# Patient Record
Sex: MALE | Race: WHITE | NOT HISPANIC OR LATINO | Employment: FULL TIME | URBAN - METROPOLITAN AREA
[De-identification: names, ages, dates, MRNs, and addresses within clinical notes are randomized per-mention and may not be internally consistent; named-entity substitution may affect disease eponyms.]

---

## 2021-11-22 DIAGNOSIS — Z12.11 SCREENING FOR COLON CANCER: Primary | ICD-10-CM

## 2021-11-22 RX ORDER — SODIUM, POTASSIUM,MAG SULFATES 17.5-3.13G
1 SOLUTION, RECONSTITUTED, ORAL ORAL EVERY 12 HOURS
Qty: 354 ML | Refills: 0 | Status: SHIPPED | OUTPATIENT
Start: 2021-11-22 | End: 2022-11-10

## 2021-11-30 ENCOUNTER — OUTSIDE FACILITY SERVICE (OUTPATIENT)
Dept: GASTROENTEROLOGY | Facility: CLINIC | Age: 51
End: 2021-11-30

## 2021-11-30 PROCEDURE — 45378 DIAGNOSTIC COLONOSCOPY: CPT | Performed by: INTERNAL MEDICINE

## 2022-11-10 ENCOUNTER — OFFICE VISIT (OUTPATIENT)
Dept: FAMILY MEDICINE CLINIC | Facility: CLINIC | Age: 52
End: 2022-11-10

## 2022-11-10 VITALS
DIASTOLIC BLOOD PRESSURE: 88 MMHG | TEMPERATURE: 98 F | HEART RATE: 73 BPM | WEIGHT: 208.4 LBS | HEIGHT: 71 IN | OXYGEN SATURATION: 97 % | SYSTOLIC BLOOD PRESSURE: 138 MMHG | RESPIRATION RATE: 15 BRPM | BODY MASS INDEX: 29.18 KG/M2

## 2022-11-10 DIAGNOSIS — Z13.1 SCREENING FOR DIABETES MELLITUS: ICD-10-CM

## 2022-11-10 DIAGNOSIS — R73.03 PREDIABETES: ICD-10-CM

## 2022-11-10 DIAGNOSIS — Z00.00 GENERAL MEDICAL EXAM: Primary | ICD-10-CM

## 2022-11-10 DIAGNOSIS — Z11.59 NEED FOR HEPATITIS C SCREENING TEST: ICD-10-CM

## 2022-11-10 DIAGNOSIS — Z13.220 SCREENING FOR HYPERLIPIDEMIA: ICD-10-CM

## 2022-11-10 DIAGNOSIS — Z12.5 SCREENING FOR PROSTATE CANCER: ICD-10-CM

## 2022-11-10 DIAGNOSIS — Z01.89 ENCOUNTER FOR TOBACCO USE SCREENING: ICD-10-CM

## 2022-11-10 PROBLEM — E78.2 MIXED HYPERLIPIDEMIA: Status: ACTIVE | Noted: 2022-11-10

## 2022-11-10 PROCEDURE — 36415 COLL VENOUS BLD VENIPUNCTURE: CPT | Performed by: PHYSICIAN ASSISTANT

## 2022-11-10 PROCEDURE — 99396 PREV VISIT EST AGE 40-64: CPT | Performed by: PHYSICIAN ASSISTANT

## 2022-11-10 NOTE — PROGRESS NOTES
Annual Physical-Preventive Visit     Patient Name: Alex Reese  : 1970   MRN: [id number]     Chief Complaint:    Chief Complaint   Patient presents with   • Annual Exam     Needs biometric screening form for his health insurance discount.       History of Present Illness: Alex Reese is a 52 y.o. male who is here today for their annual health maintenance and physical.  He has no additional complaints today.    Subjective      Review of Systems:   Review of Systems   Constitutional: Negative for fatigue and fever.   HENT: Negative for hearing loss.    Eyes: Negative for visual disturbance.   Respiratory: Negative for shortness of breath.    Cardiovascular: Negative for chest pain, palpitations and leg swelling.   Gastrointestinal: Negative for abdominal pain, blood in stool, constipation and diarrhea.   Genitourinary: Negative for difficulty urinating.   Musculoskeletal: Negative for arthralgias and myalgias.   Skin: Negative for rash.   Allergic/Immunologic: Negative for immunocompromised state.   Psychiatric/Behavioral: Negative for dysphoric mood. The patient is not nervous/anxious.         Past History:  Medical History: has a past medical history of COVID-19, Mixed hyperlipidemia, and Prediabetes.   Surgical History: has no past surgical history on file.   Family History: family history includes Colon cancer in his father.   Social History: reports that he quit smoking about 21 years ago. His smoking use included cigarettes. He started smoking about 31 years ago. He has a 10.00 pack-year smoking history. He has never used smokeless tobacco. He reports current alcohol use. He reports that he does not use drugs.    Health Maintenance   Topic Date Due   • ANNUAL PHYSICAL  Never done   • HEPATITIS C SCREENING  Never done   • ZOSTER VACCINE (1 of 2) 11/10/2022 (Originally 2020)   • COVID-19 Vaccine (1) 2022 (Originally 1970)   • INFLUENZA VACCINE  2023 (Originally  "8/1/2022)   • LIPID PANEL  11/11/2022   • TDAP/TD VACCINES (2 - Td or Tdap) 12/13/2023   • COLORECTAL CANCER SCREENING  11/30/2026   • Pneumococcal Vaccine 0-64  Aged Out        Immunization History   Administered Date(s) Administered   • Tdap 12/13/2013       Medications:   No current outpatient medications on file.    Allergies:   Allergies   Allergen Reactions   • Penicillins Unknown - High Severity     Pt has reaction when he was a child        Depression: PHQ-2 Depression Screening  Little interest or pleasure in doing things?     Feeling down, depressed, or hopeless?     PHQ-2 Total Score        Predictive Model Details   No score data available for Risk of Fall         Objective     Physical Exam:  Vital Signs:   Vitals:    11/10/22 0839   BP: 138/88   BP Location: Right arm   Patient Position: Sitting   Cuff Size: Large Adult   Pulse: 73   Resp: 15   Temp: 98 °F (36.7 °C)   TempSrc: Temporal   SpO2: 97%   Weight: 94.5 kg (208 lb 6.4 oz)   Height: 180.3 cm (71\")     Body mass index is 29.07 kg/m².   BMI is >= 25 and <30. (Overweight) The following options were offered after discussion;: weight loss educational material (shared in after visit summary)       Physical Exam  Constitutional:       Appearance: He is normal weight.   Cardiovascular:      Rate and Rhythm: Normal rate and regular rhythm.   Pulmonary:      Effort: Pulmonary effort is normal.      Breath sounds: Normal breath sounds.   Neurological:      General: No focal deficit present.   Psychiatric:         Thought Content: Thought content normal.         Judgment: Judgment normal.         Procedures    Assessment / Plan      Assessment/Plan:   Diagnoses and all orders for this visit:    1. General medical exam (Primary)  -     Comprehensive Metabolic Panel  -     CBC & Differential  -     Vitamin B12  -     Folate  -     Lipid Panel  -     Hemoglobin A1c  -     TSH  -     T4, Free  -     Hepatitis C Antibody  -     PSA Screen  -     CK  -     " Nicotine & Metabolite, Quant    2. Screening for hyperlipidemia  -     Lipid Panel    3. Screening for diabetes mellitus  -     Hemoglobin A1c    4. Screening for prostate cancer  -     PSA Screen    5. Encounter for tobacco use screening  -     Nicotine & Metabolite, Quant    6. Need for hepatitis C screening test  -     Hepatitis C Antibody    7. Prediabetes  Assessment & Plan:  Counseled on diet and exercise, will continue to monitor.           No current outpatient medications on file.    Follow Up:   Return in about 1 year (around 11/10/2023) for Annual physical.    Healthcare Maintenance:   Counseling provided on healthy diet and exercise.  His colonoscopy is up-to-date.  He declines vaccinations.    Alex Hugh voices understanding and acceptance of this advice.  AVS with preventive healthcare tips printed for patient.     Karin Mccartney PA-C  [facility]

## 2022-11-10 NOTE — ASSESSMENT & PLAN NOTE
Counseled on healthy diet and exercise.  Discussed metabolic syndrome.  Discussed working on diet and exercise and will continue to monitor.

## 2022-11-10 NOTE — PATIENT INSTRUCTIONS
"Healthy Eating  Following a healthy eating pattern may help you to achieve and maintain a healthy body weight, reduce the risk of chronic disease, and live a long and productive life. It is important to follow a healthy eating pattern at an appropriate calorie level for your body. Your nutritional needs should be met primarily through food by choosing a variety of nutrient-rich foods.  What are tips for following this plan?  Reading food labels  Read labels and choose the following:  Reduced or low sodium.  Juices with 100% fruit juice.  Foods with low saturated fats and high polyunsaturated and monounsaturated fats.  Foods with whole grains, such as whole wheat, cracked wheat, brown rice, and wild rice.  Whole grains that are fortified with folic acid. This is recommended for women who are pregnant or who want to become pregnant.  Read labels and avoid the following:  Foods with a lot of added sugars. These include foods that contain brown sugar, corn sweetener, corn syrup, dextrose, fructose, glucose, high-fructose corn syrup, honey, invert sugar, lactose, malt syrup, maltose, molasses, raw sugar, sucrose, trehalose, or turbinado sugar.  Do not eat more than the following amounts of added sugar per day:  6 teaspoons (25 g) for women.  9 teaspoons (38 g) for men.  Foods that contain processed or refined starches and grains.  Refined grain products, such as white flour, degermed cornmeal, white bread, and white rice.  Shopping  Choose nutrient-rich snacks, such as vegetables, whole fruits, and nuts. Avoid high-calorie and high-sugar snacks, such as potato chips, fruit snacks, and candy.  Use oil-based dressings and spreads on foods instead of solid fats such as butter, stick margarine, or cream cheese.  Limit pre-made sauces, mixes, and \"instant\" products such as flavored rice, instant noodles, and ready-made pasta.  Try more plant-protein sources, such as tofu, tempeh, black beans, edamame, lentils, nuts, and " seeds.  Explore eating plans such as the Mediterranean diet or vegetarian diet.  Cooking  Use oil to sauté or stir-contreras foods instead of solid fats such as butter, stick margarine, or lard.  Try baking, boiling, grilling, or broiling instead of frying.  Remove the fatty part of meats before cooking.  Steam vegetables in water or broth.  Meal planning    At meals, imagine dividing your plate into fourths:  One-half of your plate is fruits and vegetables.  One-fourth of your plate is whole grains.  One-fourth of your plate is protein, especially lean meats, poultry, eggs, tofu, beans, or nuts.  Include low-fat dairy as part of your daily diet.     Lifestyle  Choose healthy options in all settings, including home, work, school, restaurants, or stores.  Prepare your food safely:  Wash your hands after handling raw meats.  Keep food preparation surfaces clean by regularly washing with hot, soapy water.  Keep raw meats separate from ready-to-eat foods, such as fruits and vegetables.  , meat, poultry, and eggs to the recommended internal temperature.  Store foods at safe temperatures. In general:  Keep cold foods at 40°F (4.4°C) or below.  Keep hot foods at 140°F (60°C) or above.  Keep your freezer at 0°F (-17.8°C) or below.  Foods are no longer safe to eat when they have been between the temperatures of 40°-140°F (4.4-60°C) for more than 2 hours.  What foods should I eat?  Fruits  Aim to eat 2 cup-equivalents of fresh, canned (in natural juice), or frozen fruits each day. Examples of 1 cup-equivalent of fruit include 1 small apple, 8 large strawberries, 1 cup canned fruit, ½ cup dried fruit, or 1 cup 100% juice.  Vegetables  Aim to eat 2½-3 cup-equivalents of fresh and frozen vegetables each day, including different varieties and colors. Examples of 1 cup-equivalent of vegetables include 2 medium carrots, 2 cups raw, leafy greens, 1 cup chopped vegetable (raw or cooked), or 1 medium baked potato.  Grains  Aim  to eat 6 ounce-equivalents of whole grains each day. Examples of 1 ounce-equivalent of grains include 1 slice of bread, 1 cup ready-to-eat cereal, 3 cups popcorn, or ½ cup cooked rice, pasta, or cereal.  Meats and other proteins  Aim to eat 5-6 ounce-equivalents of protein each day. Examples of 1 ounce-equivalent of protein include 1 egg, 1/2 cup nuts or seeds, or 1 tablespoon (16 g) peanut butter. A cut of meat or fish that is the size of a deck of cards is about 3-4 ounce-equivalents.  Of the protein you eat each week, try to have at least 8 ounces come from seafood. This includes salmon, trout, herring, and anchovies.  Dairy  Aim to eat 3 cup-equivalents of fat-free or low-fat dairy each day. Examples of 1 cup-equivalent of dairy include 1 cup (240 mL) milk, 8 ounces (250 g) yogurt, 1½ ounces (44 g) natural cheese, or 1 cup (240 mL) fortified soy milk.  Fats and oils  Aim for about 5 teaspoons (21 g) per day. Choose monounsaturated fats, such as canola and olive oils, avocados, peanut butter, and most nuts, or polyunsaturated fats, such as sunflower, corn, and soybean oils, walnuts, pine nuts, sesame seeds, sunflower seeds, and flaxseed.  Beverages  Aim for six 8-oz glasses of water per day. Limit coffee to three to five 8-oz cups per day.  Limit caffeinated beverages that have added calories, such as soda and energy drinks.  Limit alcohol intake to no more than 1 drink a day for nonpregnant women and 2 drinks a day for men. One drink equals 12 oz of beer (355 mL), 5 oz of wine (148 mL), or 1½ oz of hard liquor (44 mL).  Seasoning and other foods  Avoid adding excess amounts of salt to your foods. Try flavoring foods with herbs and spices instead of salt.  Avoid adding sugar to foods.  Try using oil-based dressings, sauces, and spreads instead of solid fats.  This information is based on general [place] nutrition guidelines. For more information, visit choosemyplate.gov. Exact amounts may vary based on your  nutrition needs.  Summary  A healthy eating plan may help you to maintain a healthy weight, reduce the risk of chronic diseases, and stay active throughout your life.  Plan your meals. Make sure you eat the right portions of a variety of nutrient-rich foods.  Try baking, boiling, grilling, or broiling instead of frying.  Choose healthy options in all settings, including home, work, school, restaurants, or stores.  This information is not intended to replace advice given to you by your health care provider. Make sure you discuss any questions you have with your health care provider.  Document Revised: 04/01/2019 Document Reviewed: 04/01/2019  Elsevier Patient Education © 2021 [employer] Inc.

## 2022-11-11 LAB
ALBUMIN SERPL-MCNC: NORMAL G/DL
ALP SERPL-CCNC: NORMAL U/L
ALT SERPL-CCNC: NORMAL U/L
AST SERPL-CCNC: NORMAL U/L
BASOPHILS # BLD AUTO: 0 X10E3/UL (ref 0–0.2)
BASOPHILS NFR BLD AUTO: 1 %
BILIRUB SERPL-MCNC: NORMAL MG/DL
BUN SERPL-MCNC: NORMAL MG/DL
CALCIUM SERPL-MCNC: NORMAL MG/DL
CHLORIDE SERPL-SCNC: NORMAL MMOL/L
CHOLEST SERPL-MCNC: 321 MG/DL (ref 100–199)
CK SERPL-CCNC: 117 U/L (ref 41–331)
CO2 SERPL-SCNC: NORMAL MMOL/L
CREAT SERPL-MCNC: NORMAL MG/DL
EOSINOPHIL # BLD AUTO: 0.1 X10E3/UL (ref 0–0.4)
EOSINOPHIL NFR BLD AUTO: 2 %
ERYTHROCYTE [DISTWIDTH] IN BLOOD BY AUTOMATED COUNT: 12.2 % (ref 11.6–15.4)
FOLATE SERPL-MCNC: 17.2 NG/ML
GLUCOSE SERPL-MCNC: NORMAL MG/DL
HBA1C MFR BLD: 6 % (ref 4.8–5.6)
HCT VFR BLD AUTO: 46.2 % (ref 37.5–51)
HCV AB S/CO SERPL IA: <0.1 S/CO RATIO (ref 0–0.9)
HDLC SERPL-MCNC: 63 MG/DL
HGB BLD-MCNC: 15.6 G/DL (ref 13–17.7)
IMM GRANULOCYTES # BLD AUTO: 0 X10E3/UL (ref 0–0.1)
IMM GRANULOCYTES NFR BLD AUTO: 1 %
LABORATORY COMMENT REPORT: ABNORMAL
LDLC SERPL CALC-MCNC: 236 MG/DL (ref 0–99)
LYMPHOCYTES # BLD AUTO: 1.9 X10E3/UL (ref 0.7–3.1)
LYMPHOCYTES NFR BLD AUTO: 32 %
MCH RBC QN AUTO: 31 PG (ref 26.6–33)
MCHC RBC AUTO-ENTMCNC: 33.8 G/DL (ref 31.5–35.7)
MCV RBC AUTO: 92 FL (ref 79–97)
MONOCYTES # BLD AUTO: 0.5 X10E3/UL (ref 0.1–0.9)
MONOCYTES NFR BLD AUTO: 8 %
NEUTROPHILS # BLD AUTO: 3.4 X10E3/UL (ref 1.4–7)
NEUTROPHILS NFR BLD AUTO: 56 %
PLATELET # BLD AUTO: 322 X10E3/UL (ref 150–450)
POTASSIUM SERPL-SCNC: NORMAL MMOL/L
PROT SERPL-MCNC: NORMAL G/DL
PSA SERPL-MCNC: 1.4 NG/ML (ref 0–4)
RBC # BLD AUTO: 5.04 X10E6/UL (ref 4.14–5.8)
SODIUM SERPL-SCNC: NORMAL MMOL/L
SPECIMEN STATUS: NORMAL
T4 FREE SERPL-MCNC: 1.12 NG/DL (ref 0.82–1.77)
TRIGL SERPL-MCNC: 124 MG/DL (ref 0–149)
TSH SERPL DL<=0.005 MIU/L-ACNC: 1.07 UIU/ML (ref 0.45–4.5)
VIT B12 SERPL-MCNC: 369 PG/ML (ref 232–1245)
VLDLC SERPL CALC-MCNC: 22 MG/DL (ref 5–40)
WBC # BLD AUTO: 6 X10E3/UL (ref 3.4–10.8)

## 2022-11-12 LAB
ALBUMIN SERPL-MCNC: 5 G/DL (ref 3.8–4.9)
ALBUMIN/GLOB SERPL: 1.9 {RATIO} (ref 1.2–2.2)
ALP SERPL-CCNC: 80 IU/L (ref 44–121)
ALT SERPL-CCNC: 66 IU/L (ref 0–44)
AST SERPL-CCNC: 44 IU/L (ref 0–40)
BILIRUB SERPL-MCNC: 0.4 MG/DL (ref 0–1.2)
BUN SERPL-MCNC: 17 MG/DL (ref 6–24)
BUN/CREAT SERPL: 16 (ref 9–20)
CALCIUM SERPL-MCNC: 9.9 MG/DL (ref 8.7–10.2)
CHLORIDE SERPL-SCNC: 101 MMOL/L (ref 96–106)
CO2 SERPL-SCNC: 18 MMOL/L (ref 20–29)
CREAT SERPL-MCNC: 1.04 MG/DL (ref 0.76–1.27)
EGFRCR SERPLBLD CKD-EPI 2021: 86 ML/MIN/1.73
GLOBULIN SER CALC-MCNC: 2.7 G/DL (ref 1.5–4.5)
GLUCOSE SERPL-MCNC: 100 MG/DL (ref 70–99)
POTASSIUM SERPL-SCNC: 4.8 MMOL/L (ref 3.5–5.2)
PROT SERPL-MCNC: 7.7 G/DL (ref 6–8.5)
SODIUM SERPL-SCNC: 141 MMOL/L (ref 134–144)

## 2022-11-16 LAB
COTININE SERPL-MCNC: <1 NG/ML
NICOTINE SERPL-MCNC: <1 NG/ML

## 2022-11-17 ENCOUNTER — TELEPHONE (OUTPATIENT)
Dept: FAMILY MEDICINE CLINIC | Facility: CLINIC | Age: 52
End: 2022-11-17

## 2022-11-17 NOTE — TELEPHONE ENCOUNTER
----- Message from GAGE Diez sent at 11/16/2022  5:02 PM EST -----  Patient's LDL which is his bad cholesterol is very elevated at 236.  He is got some liver enzymes that are a little elevated.  His nicotine screening was negative.  His hemoglobin A1c is in the prediabetes range.  Please have patient schedule appointment with me to discuss his labs and his treatment options.

## 2022-11-17 NOTE — TELEPHONE ENCOUNTER
Caller: Eric Sin    Relationship: Self    Best call back number: [phone]    What is the best time to reach you: ANYTIME    Who are you requesting to speak with (clinical staff, provider,  specific staff member): CLINICAL STAFF    Do you know the name of the person who called: ERIC    What was the call regarding: VITALITY CHECK PAPERWORK. PATIENT WOULD LIKE TO KNOW IF THIS HAS BEEN FILLED OUT. SHE WOULD LIKE TO PICK IT UP TODAY IF POSSIBLE.    Do you require a callback: YES.

## 2022-11-21 ENCOUNTER — OFFICE VISIT (OUTPATIENT)
Dept: FAMILY MEDICINE CLINIC | Facility: CLINIC | Age: 52
End: 2022-11-21

## 2022-11-21 VITALS
HEART RATE: 86 BPM | RESPIRATION RATE: 15 BRPM | WEIGHT: 208.2 LBS | HEIGHT: 71 IN | OXYGEN SATURATION: 97 % | DIASTOLIC BLOOD PRESSURE: 84 MMHG | BODY MASS INDEX: 29.15 KG/M2 | TEMPERATURE: 98 F | SYSTOLIC BLOOD PRESSURE: 138 MMHG

## 2022-11-21 DIAGNOSIS — R74.8 ELEVATED LIVER ENZYMES: ICD-10-CM

## 2022-11-21 DIAGNOSIS — R73.03 PREDIABETES: ICD-10-CM

## 2022-11-21 DIAGNOSIS — E78.00 HYPERCHOLESTEROLEMIA: Primary | ICD-10-CM

## 2022-11-21 DIAGNOSIS — Z79.899 HIGH RISK MEDICATION USE: ICD-10-CM

## 2022-11-21 PROBLEM — E78.2 MIXED HYPERLIPIDEMIA: Status: RESOLVED | Noted: 2022-11-10 | Resolved: 2022-11-21

## 2022-11-21 PROCEDURE — 99214 OFFICE O/P EST MOD 30 MIN: CPT | Performed by: PHYSICIAN ASSISTANT

## 2022-11-21 RX ORDER — ATORVASTATIN CALCIUM 20 MG/1
20 TABLET, FILM COATED ORAL DAILY
Qty: 90 TABLET | Refills: 0 | Status: SHIPPED | OUTPATIENT
Start: 2022-11-21 | End: 2023-03-06

## 2022-11-21 NOTE — PROGRESS NOTES
Patient Office Visit      Patient Name: Alex Reese  : 1970   MRN: [id number]     Chief Complaint:    Chief Complaint   Patient presents with   • Hyperlipidemia   • Elevated Hepatic Enzymes   • Prediabetes       History of Present Illness: Alex Reese is a 52 y.o. male who is here today to discuss labs done on November 10.  His ALT was increased at 66 and his AST was slightly increased at 44.  His A1c is still in the prediabetes range at 6.0.  This was 6.1 in 2021.  His total cholesterol was elevated at 321, HDL 63,  and triglycerides 124.  His previous LDL was 177 and previous total cholesterol was 278.  There is a strong family history of premature coronary artery disease.  His father  of a massive heart attack at age 53.  His father was a smoker and Alex is not a smoker.  Alex does admit to some daily alcohol use and eating poorly.  He said since he saw his results he has cut back on alcohol and started eating better.      Subjective      Review of Systems:   Review of Systems   Constitutional: Negative for fatigue.   Respiratory: Negative for shortness of breath.    Cardiovascular: Negative for chest pain, palpitations and leg swelling.        Past Medical History:   Past Medical History:   Diagnosis Date   • COVID-19    • Mixed hyperlipidemia    • Prediabetes        Past Surgical History: History reviewed. No pertinent surgical history.    Family History:   Family History   Problem Relation Age of Onset   • Colon cancer Father        Social History:   Social History     Socioeconomic History   • Marital status:    Tobacco Use   • Smoking status: Former     Packs/day: 1.00     Years: 10.00     Pack years: 10.00     Types: Cigarettes     Start date:      Quit date: 2001     Years since quittin.9   • Smokeless tobacco: Never   Vaping Use   • Vaping Use: Never used   Substance and Sexual Activity   • Alcohol use: Yes     Comment: soically   •  "Drug use: Never   • Sexual activity: Yes     Partners: Female       Allergies:   Allergies   Allergen Reactions   • Penicillins Unknown - High Severity     Pt has reaction when he was a child        Objective     Physical Exam:  Vital Signs:   Vitals:    11/21/22 1023   BP: 138/84   BP Location: Right arm   Patient Position: Sitting   Cuff Size: Large Adult   Pulse: 86   Resp: 15   Temp: 98 °F (36.7 °C)   SpO2: 97%   Weight: 94.4 kg (208 lb 3.2 oz)   Height: 180.3 cm (71\")     Body mass index is 29.04 kg/m².   BMI is >= 25 and <30. (Overweight) The following options were offered after discussion;: weight loss educational material (shared in after visit summary)       Physical Exam  Constitutional:       Appearance: He is normal weight.   Cardiovascular:      Rate and Rhythm: Normal rate and regular rhythm.   Pulmonary:      Effort: Pulmonary effort is normal.      Breath sounds: Normal breath sounds.   Neurological:      General: No focal deficit present.   Psychiatric:         Thought Content: Thought content normal.         Judgment: Judgment normal.         Procedures    Assessment / Plan      Assessment/Plan:   Diagnoses and all orders for this visit:    1. Hypercholesterolemia (Primary)  -     atorvastatin (LIPITOR) 20 MG tablet; Take 1 tablet by mouth Daily.  Dispense: 90 tablet; Refill: 0  -     Lipid Panel; Future    2. High risk medication use  -     Comprehensive Metabolic Panel; Future  -     CBC & Differential; Future  -     CK; Future    3. Prediabetes  -     Hemoglobin A1c; Future    4. Elevated liver enzymes  -     Comprehensive Metabolic Panel; Future       Continue work on healthy diet and exercise.  He most likely has a hereditary hyperlipidemia.  His LDL goal is less than 70.  We will start atorvastatin 20 mg daily and check lipids again in about 3 months.  More than likely were going to need atorvastatin 40 or possibly even 80 mg daily.  Continue to cut back on the alcohol use as this should help " his blood sugar and his liver enzymes.    Medications:     Current Outpatient Medications:   •  atorvastatin (LIPITOR) 20 MG tablet, Take 1 tablet by mouth Daily., Disp: 90 tablet, Rfl: 0        Follow Up:   Return in about 3 months (around 2/21/2023) for 30 minute med recheck with labs one week prior.    Karin Mccartney PA-C   [facility]

## 2023-03-06 DIAGNOSIS — E78.00 HYPERCHOLESTEROLEMIA: ICD-10-CM

## 2023-03-06 RX ORDER — ATORVASTATIN CALCIUM 20 MG/1
TABLET, FILM COATED ORAL
Qty: 90 TABLET | Refills: 0 | Status: SHIPPED | OUTPATIENT
Start: 2023-03-06

## 2023-03-13 DIAGNOSIS — E78.00 HYPERCHOLESTEROLEMIA: ICD-10-CM

## 2023-03-13 RX ORDER — ATORVASTATIN CALCIUM 20 MG/1
20 TABLET, FILM COATED ORAL DAILY
Qty: 90 TABLET | Refills: 0 | OUTPATIENT
Start: 2023-03-13

## 2023-03-13 NOTE — TELEPHONE ENCOUNTER
Caller: Alex Reese    Relationship: Self    Best call back number: [phone]    Requested Prescriptions:   Requested Prescriptions     Pending Prescriptions Disp Refills   • atorvastatin (LIPITOR) 20 MG tablet 90 tablet 0     Sig: Take 1 tablet by mouth Daily.        Pharmacy where request should be sent: [facility]/PHARMACY #[id number] - [facility] [address] [facility] - [phone] [facility] [phone]      Additional details provided by patient:  PATIENT OUT OF MEDICATION.      Does the patient have less than a 3 day supply:  [x] Yes  [] No    Would you like a call back once the refill request has been completed: [x] Yes [] No    If the office needs to give you a call back, can they leave a voicemail: [x] Yes [] No    Ena Lopez   03/13/23 08:28 EDT

## 2023-06-20 ENCOUNTER — TELEPHONE (OUTPATIENT)
Dept: FAMILY MEDICINE CLINIC | Facility: CLINIC | Age: 53
End: 2023-06-20

## 2023-06-20 NOTE — TELEPHONE ENCOUNTER
Incoming Refill Request      Medication requested (name and dose):   LIPITOR 20 mg     Pharmacy where request should be sent:   [facility]     Additional details provided by patient: Patient was put on med back in March, just ran out yesterday. He did sched a lab appt to check his levels this Thursday but I couldn't get him sched for an OV til July 20 w/ Karin. Can she send in enough to get him through til appt.     Best call back number: [phone]    Does the patient have less than a 3 day supply:  [x] Yes  [] No    Manjula Hein Rep  06/20/23, 09:03 EDT

## 2023-07-07 ENCOUNTER — TELEPHONE (OUTPATIENT)
Dept: FAMILY MEDICINE CLINIC | Facility: CLINIC | Age: 53
End: 2023-07-07

## 2023-07-07 DIAGNOSIS — E78.00 HYPERCHOLESTEROLEMIA: ICD-10-CM

## 2023-07-07 RX ORDER — ATORVASTATIN CALCIUM 20 MG/1
20 TABLET, FILM COATED ORAL DAILY
Qty: 90 TABLET | Refills: 0 | Status: CANCELLED | OUTPATIENT
Start: 2023-07-07

## 2023-07-07 NOTE — TELEPHONE ENCOUNTER
Rx Refill Note    Requested Prescriptions     Pending Prescriptions Disp Refills    atorvastatin (LIPITOR) 20 MG tablet 90 tablet 0     Sig: Take 1 tablet by mouth Daily.        Last office visit with prescribing clinician: 11/21/2022      Next office visit with prescribing clinician: 7/20/2023   Last labs:   Last refill: can you refill a 90 day for patient?  Insurance will not cover unless it is a 90 day.  Patient has not been seen since 11/21/2022   Pharmacy (be sure to add in Epic). correct

## 2023-07-07 NOTE — TELEPHONE ENCOUNTER
Caller: CHUCK STANTON    Relationship: Emergency Contact    Best call back number: [phone]    What medications are you currently taking:   Current Outpatient Medications on File Prior to Visit   Medication Sig Dispense Refill    atorvastatin (LIPITOR) 20 MG tablet Take 1 tablet by mouth Daily. 30 tablet 0     No current facility-administered medications on file prior to visit.      Which medication are you concerned about: atorvastatin (LIPITOR) 20 MG tablet     Who prescribed you this medication: SERGIO     What are your concerns: PATIENT'S PHARMACY TOLD THE PATIENT THAT THEY HAVE NOT GOTTEN A RESPONSE FROM THE DOCTOR AND TOLD THEM TO CALL.  INSURANCE WILL NOT FILL PRESCRIPTION IF IT IS NOT 90 DAYS.

## 2023-08-04 DIAGNOSIS — E78.00 HYPERCHOLESTEROLEMIA: ICD-10-CM

## 2023-08-04 RX ORDER — ATORVASTATIN CALCIUM 20 MG/1
TABLET, FILM COATED ORAL
Qty: 30 TABLET | Refills: 1 | OUTPATIENT
Start: 2023-08-04

## 2023-09-12 DIAGNOSIS — E78.00 HYPERCHOLESTEROLEMIA: ICD-10-CM

## 2023-09-12 NOTE — TELEPHONE ENCOUNTER
Caller: CHUCK STANTON    Relationship: Emergency Contact    Best call back number: [phone]     Requested Prescriptions:   Requested Prescriptions     Pending Prescriptions Disp Refills    atorvastatin (LIPITOR) 20 MG tablet 30 tablet 1     Sig: Take 1 tablet by mouth Daily.        Pharmacy where request should be sent: [facility]/PHARMACY #[id number] - [place], [place] - [address] - [phone]  - [phone]     Last office visit with prescribing clinician: 11/21/2022   Last telemedicine visit with prescribing clinician: Visit date not found   Next office visit with prescribing clinician: 10/4/2023     Additional details provided by patient:   PATIENT'S (WIFE) CHUCK STATED THAT THIS MEDICATION IS CHEAPER WITH INSURANCE IF CALLED IN FOR A 90 DAY SUPPLIES INSTEAD OF A 30 DAY SUPPLIES SINCE WITH THE 30 DAY SUPPLIES PATIENT HAS TO PAY A INCREASED COST OUT OF POCKET     Does the patient have less than a 3 day supply:  [x] Yes  [] No    Would you like a call back once the refill request has been completed: [] Yes [x] No    If the office needs to give you a call back, can they leave a voicemail: [] Yes [x] No    Manjula Denson Rep   09/12/23 15:04 EDT

## 2023-09-13 RX ORDER — ATORVASTATIN CALCIUM 20 MG/1
20 TABLET, FILM COATED ORAL DAILY
Qty: 90 TABLET | Refills: 0 | Status: SHIPPED | OUTPATIENT
Start: 2023-09-13

## 2023-09-13 NOTE — TELEPHONE ENCOUNTER
Rx Refill Note    Requested Prescriptions     Pending Prescriptions Disp Refills    atorvastatin (LIPITOR) 20 MG tablet 90 tablet 0     Sig: Take 1 tablet by mouth Daily.        Last office visit with prescribing clinician: 11/21/2022      Next office visit with prescribing clinician: 10/4/2023   Last labs:   Last refill: needs 90 day due to it being cheaper through insurance    Pharmacy (be sure to add in Epic). correct

## 2023-09-13 NOTE — TELEPHONE ENCOUNTER
Patient's wife called stating 90 days is less expensive.  I tried to call both patient and his wife.  [place] the [place].  I had a discussion with her that the reason for her only 30-day prescriptions is because of noncompliance.  His cholesterol was very elevated and he was last seen in November and was supposed to come back in February and he has canceled numerous appointments.  I told her I would send in this 90 days but he has to keep his appointment in October and if he does not I will no longer continue to see him due to noncompliance.

## 2023-10-04 ENCOUNTER — OFFICE VISIT (OUTPATIENT)
Dept: FAMILY MEDICINE CLINIC | Facility: CLINIC | Age: 53
End: 2023-10-04
Payer: COMMERCIAL

## 2023-10-04 VITALS
WEIGHT: 209.2 LBS | SYSTOLIC BLOOD PRESSURE: 132 MMHG | OXYGEN SATURATION: 100 % | HEART RATE: 70 BPM | RESPIRATION RATE: 15 BRPM | DIASTOLIC BLOOD PRESSURE: 84 MMHG | TEMPERATURE: 98 F | HEIGHT: 71 IN | BODY MASS INDEX: 29.29 KG/M2

## 2023-10-04 DIAGNOSIS — E78.00 HYPERCHOLESTEROLEMIA: ICD-10-CM

## 2023-10-04 DIAGNOSIS — Z00.00 GENERAL MEDICAL EXAM: Primary | ICD-10-CM

## 2023-10-04 DIAGNOSIS — Z01.89 ENCOUNTER FOR SCREENING FOR TOBACCO USE: ICD-10-CM

## 2023-10-04 DIAGNOSIS — R73.03 PREDIABETES: ICD-10-CM

## 2023-10-04 DIAGNOSIS — R74.8 ELEVATED LIVER ENZYMES: ICD-10-CM

## 2023-10-04 DIAGNOSIS — R05.3 CHRONIC COUGH: ICD-10-CM

## 2023-10-04 RX ORDER — LEVOCETIRIZINE DIHYDROCHLORIDE 5 MG/1
5 TABLET, FILM COATED ORAL EVERY EVENING
Qty: 90 TABLET | Refills: 3 | Status: SHIPPED | OUTPATIENT
Start: 2023-10-04

## 2023-10-04 RX ORDER — ATORVASTATIN CALCIUM 20 MG/1
20 TABLET, FILM COATED ORAL DAILY
Qty: 90 TABLET | Refills: 3 | Status: SHIPPED | OUTPATIENT
Start: 2023-10-04

## 2023-10-04 NOTE — ASSESSMENT & PLAN NOTE
Examinations offered and declined.  Counseled patient on healthy diet and exercise.  His last PSA was checked less than a year ago so we will have to check his PSA level next visit.

## 2023-10-04 NOTE — PROGRESS NOTES
Annual Physical-Preventive Visit     Patient Name: Alex Reese  : 1970   MRN: [id number]     Chief Complaint:    Chief Complaint   Patient presents with    Annual Exam    Cough    Hyperlipidemia       History of Present Illness: Alex Reese is a 53 y.o. male who is here today for their annual health maintenance and physical.  He needs his biometric screening paperwork completed.  He needs his cholesterol medication refilled.  He also complains of a chronic cough for as long as he can remember.  The cough will be productive of clear to white mucus.  He is unsure if the mucus is coming from the back of his throat or from his lungs.  He denies a history of asthma or allergies.  He denies feeling bad denies any wheezing.  His wife wanted him to have the cough evaluated.    Subjective      Review of Systems:   Review of Systems   Constitutional:  Negative for fatigue and fever.   HENT:  Negative for hearing loss.    Eyes:  Negative for visual disturbance.   Respiratory:  Positive for cough. Negative for shortness of breath.    Cardiovascular:  Negative for chest pain, palpitations and leg swelling.   Gastrointestinal:  Negative for abdominal pain, blood in stool, constipation and diarrhea.   Genitourinary:  Negative for difficulty urinating.   Musculoskeletal:  Negative for arthralgias and myalgias.   Skin:  Negative for rash.   Allergic/Immunologic: Negative for immunocompromised state.   Psychiatric/Behavioral:  Negative for dysphoric mood. The patient is not nervous/anxious.       Past History:  Medical History: has a past medical history of COVID-19, Mixed hyperlipidemia, and Prediabetes.   Surgical History: has no past surgical history on file.   Family History: family history includes Colon cancer in his father.   Social History: reports that he quit smoking about 22 years ago. His smoking use included cigarettes. He started smoking about 32 years ago. He has a 10.00 pack-year smoking  "history. He has never used smokeless tobacco. He reports current alcohol use. He reports that he does not use drugs.    Health Maintenance   Topic Date Due    INFLUENZA VACCINE  03/31/2024 (Originally 8/1/2023)    ZOSTER VACCINE (1 of 2) 10/04/2024 (Originally 4/12/2020)    ANNUAL PHYSICAL  11/10/2023    LIPID PANEL  11/10/2023    BMI FOLLOWUP  11/21/2023    TDAP/TD VACCINES (2 - Td or Tdap) 12/13/2023    COLORECTAL CANCER SCREENING  11/30/2026    HEPATITIS C SCREENING  Completed    Pneumococcal Vaccine 0-64  Aged Out    COVID-19 Vaccine  Discontinued        Immunization History   Administered Date(s) Administered    Tdap 12/13/2013       Medications:     Current Outpatient Medications:     atorvastatin (LIPITOR) 20 MG tablet, Take 1 tablet by mouth Daily., Disp: 90 tablet, Rfl: 3    levocetirizine (XYZAL) 5 MG tablet, Take 1 tablet by mouth Every Evening., Disp: 90 tablet, Rfl: 3    Allergies:   Allergies   Allergen Reactions    Penicillins Unknown - High Severity     Pt has reaction when he was a child        Depression: PHQ-2 Depression Screening  Little interest or pleasure in doing things?     Feeling down, depressed, or hopeless?     PHQ-2 Total Score        Predictive Model Details   No score data available for Risk of Fall         Objective     Physical Exam:  Vital Signs:   Vitals:    10/04/23 1011   BP: 132/84   BP Location: Right arm   Patient Position: Sitting   Cuff Size: Adult   Pulse: 70   Resp: 15   Temp: 98 °F (36.7 °C)   TempSrc: Temporal   SpO2: 100%   Weight: 94.9 kg (209 lb 3.2 oz)   Height: 180.3 cm (71\")     Body mass index is 29.18 kg/m².           Physical Exam  Constitutional:       Appearance: He is overweight.   Cardiovascular:      Rate and Rhythm: Normal rate and regular rhythm.   Pulmonary:      Effort: Pulmonary effort is normal.      Breath sounds: Normal breath sounds.   Neurological:      General: No focal deficit present.   Psychiatric:         Thought Content: Thought content " normal.         Judgment: Judgment normal.       Procedures    Assessment / Plan      Assessment/Plan:   Diagnoses and all orders for this visit:    1. General medical exam (Primary)  Assessment & Plan:  Examinations offered and declined.  Counseled patient on healthy diet and exercise.  His last PSA was checked less than a year ago so we will have to check his PSA level next visit.    Orders:  -     Comprehensive Metabolic Panel  -     Vitamin B12  -     Folate  -     Lipid Panel  -     Hemoglobin A1c  -     CK  -     TSH  -     T4, Free  -     CBC Auto Differential    2. Prediabetes  Assessment & Plan:  Monitoring.    Orders:  -     Hemoglobin A1c    3. Elevated liver enzymes  Assessment & Plan:  Monitoring.    Orders:  -     Comprehensive Metabolic Panel    4. Hypercholesterolemia  Assessment & Plan:  Lipid abnormalities are improving with treatment.  Pharmacotherapy as ordered.  Lipids will be reassessed in 1 year.    Orders:  -     Lipid Panel  -     atorvastatin (LIPITOR) 20 MG tablet; Take 1 tablet by mouth Daily.  Dispense: 90 tablet; Refill: 3    5. Encounter for screening for tobacco use  -     Nicotine & Metabolite, Quant    6. Chronic cough  Assessment & Plan:  Cough for as long as he can remember.  He says he does not feel bad.  He denies any shortness of breath.  His wife wanted him to have his cough evaluated.  We will get a chest x-ray and start him on Xyzal.  The 3 main reasons for chronic cough are allergies, asthma and reflux.    Orders:  -     XR Chest 2 View; Future  -     levocetirizine (XYZAL) 5 MG tablet; Take 1 tablet by mouth Every Evening.  Dispense: 90 tablet; Refill: 3             Current Outpatient Medications:     atorvastatin (LIPITOR) 20 MG tablet, Take 1 tablet by mouth Daily., Disp: 90 tablet, Rfl: 3    levocetirizine (XYZAL) 5 MG tablet, Take 1 tablet by mouth Every Evening., Disp: 90 tablet, Rfl: 3    Follow Up:   Return in about 1 year (around 10/4/2024) for Annual  physical.    Healthcare Maintenance:   Counseling provided on the diet and exercise.  Alex Reese voices understanding and acceptance of this advice.  AVS with preventive healthcare tips printed for patient.     New and pre-existing conditions were addressed today beyond the preventive services provided and were sufficient enough to require additional work therefore a problem-oriented E/M service code was also applied.     Karin Mccartney PA-C  [facility]

## 2023-10-04 NOTE — ASSESSMENT & PLAN NOTE
Cough for as long as he can remember.  He says he does not feel bad.  He denies any shortness of breath.  His wife wanted him to have his cough evaluated.  We will get a chest x-ray and start him on Xyzal.  The 3 main reasons for chronic cough are allergies, asthma and reflux.

## 2023-10-05 LAB
ALBUMIN SERPL-MCNC: 4.8 G/DL (ref 3.8–4.9)
ALBUMIN/GLOB SERPL: 2 {RATIO} (ref 1.2–2.2)
ALP SERPL-CCNC: 72 IU/L (ref 44–121)
ALT SERPL-CCNC: 145 IU/L (ref 0–44)
AST SERPL-CCNC: 91 IU/L (ref 0–40)
BASOPHILS # BLD AUTO: 0 X10E3/UL (ref 0–0.2)
BASOPHILS NFR BLD AUTO: 1 %
BILIRUB SERPL-MCNC: 0.4 MG/DL (ref 0–1.2)
BUN SERPL-MCNC: 14 MG/DL (ref 6–24)
BUN/CREAT SERPL: 16 (ref 9–20)
CALCIUM SERPL-MCNC: 9.6 MG/DL (ref 8.7–10.2)
CHLORIDE SERPL-SCNC: 100 MMOL/L (ref 96–106)
CHOLEST SERPL-MCNC: 230 MG/DL (ref 100–199)
CK SERPL-CCNC: 84 U/L (ref 41–331)
CO2 SERPL-SCNC: 25 MMOL/L (ref 20–29)
CREAT SERPL-MCNC: 0.9 MG/DL (ref 0.76–1.27)
EGFRCR SERPLBLD CKD-EPI 2021: 102 ML/MIN/1.73
EOSINOPHIL # BLD AUTO: 0.1 X10E3/UL (ref 0–0.4)
EOSINOPHIL NFR BLD AUTO: 2 %
ERYTHROCYTE [DISTWIDTH] IN BLOOD BY AUTOMATED COUNT: 12.5 % (ref 11.6–15.4)
FOLATE SERPL-MCNC: 18.1 NG/ML
GLOBULIN SER CALC-MCNC: 2.4 G/DL (ref 1.5–4.5)
GLUCOSE SERPL-MCNC: 97 MG/DL (ref 70–99)
HBA1C MFR BLD: 6.1 % (ref 4.8–5.6)
HCT VFR BLD AUTO: 43.6 % (ref 37.5–51)
HDLC SERPL-MCNC: 63 MG/DL
HGB BLD-MCNC: 14.9 G/DL (ref 13–17.7)
IMM GRANULOCYTES # BLD AUTO: 0 X10E3/UL (ref 0–0.1)
IMM GRANULOCYTES NFR BLD AUTO: 0 %
LDLC SERPL CALC-MCNC: 133 MG/DL (ref 0–99)
LYMPHOCYTES # BLD AUTO: 2.2 X10E3/UL (ref 0.7–3.1)
LYMPHOCYTES NFR BLD AUTO: 29 %
MCH RBC QN AUTO: 31.4 PG (ref 26.6–33)
MCHC RBC AUTO-ENTMCNC: 34.2 G/DL (ref 31.5–35.7)
MCV RBC AUTO: 92 FL (ref 79–97)
MONOCYTES # BLD AUTO: 0.6 X10E3/UL (ref 0.1–0.9)
MONOCYTES NFR BLD AUTO: 8 %
NEUTROPHILS # BLD AUTO: 4.4 X10E3/UL (ref 1.4–7)
NEUTROPHILS NFR BLD AUTO: 60 %
PLATELET # BLD AUTO: 275 X10E3/UL (ref 150–450)
POTASSIUM SERPL-SCNC: 4.5 MMOL/L (ref 3.5–5.2)
PROT SERPL-MCNC: 7.2 G/DL (ref 6–8.5)
RBC # BLD AUTO: 4.75 X10E6/UL (ref 4.14–5.8)
SODIUM SERPL-SCNC: 138 MMOL/L (ref 134–144)
T4 FREE SERPL-MCNC: 1.22 NG/DL (ref 0.82–1.77)
TRIGL SERPL-MCNC: 195 MG/DL (ref 0–149)
TSH SERPL DL<=0.005 MIU/L-ACNC: 1.52 UIU/ML (ref 0.45–4.5)
VIT B12 SERPL-MCNC: 458 PG/ML (ref 232–1245)
VLDLC SERPL CALC-MCNC: 34 MG/DL (ref 5–40)
WBC # BLD AUTO: 7.4 X10E3/UL (ref 3.4–10.8)

## 2023-10-09 LAB
COTININE SERPL-MCNC: <1 NG/ML
NICOTINE SERPL-MCNC: <1 NG/ML

## 2023-10-09 NOTE — PROGRESS NOTES
Liver enzymes are significantly elevated from this time last year.  Cholesterol is also still high but significant improvement from last year. Hemoglobin A1C is in the pre-diabetes range.  I do not know if maybe the liver enzymes are elevated due to alcohol use, from the cholesterol-lowering medicine or maybe some other cause.  Please schedule a follow-up visit so we can try to figure out what is causing the elevated liver enzymes.  I also left a voicemail message for you regarding these results.

## 2023-10-11 ENCOUNTER — OFFICE VISIT (OUTPATIENT)
Dept: FAMILY MEDICINE CLINIC | Facility: CLINIC | Age: 53
End: 2023-10-11
Payer: COMMERCIAL

## 2023-10-11 VITALS
HEIGHT: 71 IN | HEART RATE: 78 BPM | TEMPERATURE: 98 F | DIASTOLIC BLOOD PRESSURE: 98 MMHG | SYSTOLIC BLOOD PRESSURE: 140 MMHG | BODY MASS INDEX: 29.67 KG/M2 | WEIGHT: 211.9 LBS | OXYGEN SATURATION: 100 % | RESPIRATION RATE: 15 BRPM

## 2023-10-11 DIAGNOSIS — Z79.899 HIGH RISK MEDICATION USE: ICD-10-CM

## 2023-10-11 DIAGNOSIS — I10 PRIMARY HYPERTENSION: ICD-10-CM

## 2023-10-11 DIAGNOSIS — R74.8 ELEVATED LIVER ENZYMES: Primary | ICD-10-CM

## 2023-10-11 DIAGNOSIS — E78.00 HYPERCHOLESTEROLEMIA: ICD-10-CM

## 2023-10-11 PROCEDURE — 99214 OFFICE O/P EST MOD 30 MIN: CPT | Performed by: PHYSICIAN ASSISTANT

## 2023-10-11 RX ORDER — LOSARTAN POTASSIUM 25 MG/1
25 TABLET ORAL DAILY
Qty: 90 TABLET | Refills: 3 | Status: SHIPPED | OUTPATIENT
Start: 2023-10-11

## 2023-10-11 RX ORDER — ATORVASTATIN CALCIUM 40 MG/1
40 TABLET, FILM COATED ORAL DAILY
Qty: 90 TABLET | Refills: 3 | Status: SHIPPED | OUTPATIENT
Start: 2023-10-11

## 2023-10-11 NOTE — PATIENT INSTRUCTIONS
"Healthy Eating  Following a healthy eating pattern may help you to achieve and maintain a healthy body weight, reduce the risk of chronic disease, and live a long and productive life. It is important to follow a healthy eating pattern at an appropriate calorie level for your body. Your nutritional needs should be met primarily through food by choosing a variety of nutrient-rich foods.  What are tips for following this plan?  Reading food labels  Read labels and choose the following:  Reduced or low sodium.  Juices with 100% fruit juice.  Foods with low saturated fats and high polyunsaturated and monounsaturated fats.  Foods with whole grains, such as whole wheat, cracked wheat, brown rice, and wild rice.  Whole grains that are fortified with folic acid. This is recommended for women who are pregnant or who want to become pregnant.  Read labels and avoid the following:  Foods with a lot of added sugars. These include foods that contain brown sugar, corn sweetener, corn syrup, dextrose, fructose, glucose, high-fructose corn syrup, honey, invert sugar, lactose, malt syrup, maltose, molasses, raw sugar, sucrose, trehalose, or turbinado sugar.  Do not eat more than the following amounts of added sugar per day:  6 teaspoons (25 g) for women.  9 teaspoons (38 g) for men.  Foods that contain processed or refined starches and grains.  Refined grain products, such as white flour, degermed cornmeal, white bread, and white rice.  Shopping  Choose nutrient-rich snacks, such as vegetables, whole fruits, and nuts. Avoid high-calorie and high-sugar snacks, such as potato chips, fruit snacks, and candy.  Use oil-based dressings and spreads on foods instead of solid fats such as butter, stick margarine, or cream cheese.  Limit pre-made sauces, mixes, and \"instant\" products such as flavored rice, instant noodles, and ready-made pasta.  Try more plant-protein sources, such as tofu, tempeh, black beans, edamame, lentils, nuts, and " seeds.  Explore eating plans such as the Mediterranean diet or vegetarian diet.  Cooking  Use oil to saut‚ or stir-contreras foods instead of solid fats such as butter, stick margarine, or lard.  Try baking, boiling, grilling, or broiling instead of frying.  Remove the fatty part of meats before cooking.  Steam vegetables in water or broth.  Meal planning    At meals, imagine dividing your plate into fourths:  One-half of your plate is fruits and vegetables.  One-fourth of your plate is whole grains.  One-fourth of your plate is protein, especially lean meats, poultry, eggs, tofu, beans, or nuts.  Include low-fat dairy as part of your daily diet.     Lifestyle  Choose healthy options in all settings, including home, work, school, restaurants, or stores.  Prepare your food safely:  Wash your hands after handling raw meats.  Keep food preparation surfaces clean by regularly washing with hot, soapy water.  Keep raw meats separate from ready-to-eat foods, such as fruits and vegetables.  , meat, poultry, and eggs to the recommended internal temperature.  Store foods at safe temperatures. In general:  Keep cold foods at 40øF (4.4øC) or below.  Keep hot foods at 140øF (60øC) or above.  Keep your freezer at 0øF (-17.8øC) or below.  Foods are no longer safe to eat when they have been between the temperatures of 40ø-140øF (4.4-60øC) for more than 2 hours.  What foods should I eat?  Fruits  Aim to eat 2 cup-equivalents of fresh, canned (in natural juice), or frozen fruits each day. Examples of 1 cup-equivalent of fruit include 1 small apple, 8 large strawberries, 1 cup canned fruit, « cup dried fruit, or 1 cup 100% juice.  Vegetables  Aim to eat 2«-3 cup-equivalents of fresh and frozen vegetables each day, including different varieties and colors. Examples of 1 cup-equivalent of vegetables include 2 medium carrots, 2 cups raw, leafy greens, 1 cup chopped vegetable (raw or cooked), or 1 medium baked potato.  Grains  Aim  to eat 6 ounce-equivalents of whole grains each day. Examples of 1 ounce-equivalent of grains include 1 slice of bread, 1 cup ready-to-eat cereal, 3 cups popcorn, or « cup cooked rice, pasta, or cereal.  Meats and other proteins  Aim to eat 5-6 ounce-equivalents of protein each day. Examples of 1 ounce-equivalent of protein include 1 egg, 1/2 cup nuts or seeds, or 1 tablespoon (16 g) peanut butter. A cut of meat or fish that is the size of a deck of cards is about 3-4 ounce-equivalents.  Of the protein you eat each week, try to have at least 8 ounces come from seafood. This includes salmon, trout, herring, and anchovies.  Dairy  Aim to eat 3 cup-equivalents of fat-free or low-fat dairy each day. Examples of 1 cup-equivalent of dairy include 1 cup (240 mL) milk, 8 ounces (250 g) yogurt, 1« ounces (44 g) natural cheese, or 1 cup (240 mL) fortified soy milk.  Fats and oils  Aim for about 5 teaspoons (21 g) per day. Choose monounsaturated fats, such as canola and olive oils, avocados, peanut butter, and most nuts, or polyunsaturated fats, such as sunflower, corn, and soybean oils, walnuts, pine nuts, sesame seeds, sunflower seeds, and flaxseed.  Beverages  Aim for six 8-oz glasses of water per day. Limit coffee to three to five 8-oz cups per day.  Limit caffeinated beverages that have added calories, such as soda and energy drinks.  Limit alcohol intake to no more than 1 drink a day for nonpregnant women and 2 drinks a day for men. One drink equals 12 oz of beer (355 mL), 5 oz of wine (148 mL), or 1« oz of hard liquor (44 mL).  Seasoning and other foods  Avoid adding excess amounts of salt to your foods. Try flavoring foods with herbs and spices instead of salt.  Avoid adding sugar to foods.  Try using oil-based dressings, sauces, and spreads instead of solid fats.  This information is based on general [place] nutrition guidelines. For more information, visit choosemyplate.gov. Exact amounts may vary based on your  nutrition needs.  Summary  A healthy eating plan may help you to maintain a healthy weight, reduce the risk of chronic diseases, and stay active throughout your life.  Plan your meals. Make sure you eat the right portions of a variety of nutrient-rich foods.  Try baking, boiling, grilling, or broiling instead of frying.  Choose healthy options in all settings, including home, work, school, restaurants, or stores.  This information is not intended to replace advice given to you by your health care provider. Make sure you discuss any questions you have with your health care provider.  Document Revised: 04/01/2019 Document Reviewed: 04/01/2019  Elsevier Patient Education c 2021 [employer]

## 2023-10-11 NOTE — ASSESSMENT & PLAN NOTE
Lipids improved on atorvastatin 20 but not to goal, increase to 40 mg and will monitor liver enzymes closely.  I do not think his liver enzyme elevation is due to the statin.

## 2023-10-11 NOTE — PROGRESS NOTES
Patient Office Visit      Patient Name: Alex Reese  : 1970   MRN: [id number]     Chief Complaint:    Chief Complaint   Patient presents with    Hyperlipidemia    Elevated Hepatic Enzymes    Hypertension       History of Present Illness: Alex Reese is a 53 y.o. male who is here today to discuss recent labs.  Liver enzymes were elevated more so than they have been in the past.  Alkaline phos was normal.  Elevation is still mild.  He admits to occasional social drinking but nothing in excess.  His wife is an ultrasound tech who has scanned his liver in the past and evidently he has some fatty streaks in his liver.    Subjective      Review of Systems:   Review of Systems   Constitutional:  Negative for fatigue.   Respiratory:  Negative for shortness of breath.    Cardiovascular:  Negative for chest pain, palpitations and leg swelling.        Past Medical History:   Past Medical History:   Diagnosis Date    COVID-19     Mixed hyperlipidemia     Prediabetes        Past Surgical History: History reviewed. No pertinent surgical history.    Family History:   Family History   Problem Relation Age of Onset    Colon cancer Father        Social History:   Social History     Socioeconomic History    Marital status:    Tobacco Use    Smoking status: Former     Packs/day: 1.00     Years: 10.00     Additional pack years: 0.00     Total pack years: 10.00     Types: Cigarettes     Start date:      Quit date: 2001     Years since quittin.7    Smokeless tobacco: Never   Vaping Use    Vaping Use: Never used   Substance and Sexual Activity    Alcohol use: Yes     Comment: soically    Drug use: Never    Sexual activity: Yes     Partners: Female       Allergies:   Allergies   Allergen Reactions    Penicillins Unknown - High Severity     Pt has reaction when he was a child        Objective     Physical Exam:  Vital Signs:   Vitals:    10/11/23 0904 10/11/23 0931   BP: 140/100 140/98   BP  "Location: Right arm Right arm   Patient Position: Sitting Sitting   Cuff Size: Adult Adult   Pulse: 78    Resp: 15    Temp: 98 øF (36.7 øC)    TempSrc: Temporal    SpO2: 100%    Weight: 96.1 kg (211 lb 14.4 oz)    Height: 180.3 cm (71\")      Body mass index is 29.55 kg/mý.           Physical Exam  Constitutional:       General: He is not in acute distress.     Appearance: Normal appearance.   Neurological:      Mental Status: He is alert.   Psychiatric:         Mood and Affect: Mood normal.         Behavior: Behavior normal.         Thought Content: Thought content normal.         Judgment: Judgment normal.         Procedures    Assessment / Plan      Assessment/Plan:   Diagnoses and all orders for this visit:    1. Elevated liver enzymes (Primary)  Assessment & Plan:  Check additional labs and refer to gastroenterology.    Orders:  -     Cancel: Hepatic Function Panel  -     Gamma GT  -     Cancel: Iron  -     Iron Profile  -     Ferritin  -     Ceruloplasmin  -     Nuclear Antigen Antibody, IFA  -     Sedimentation Rate  -     Anti-Smooth Muscle Antibody Titer  -     TUAN + PE  -     Hepatitis Panel, Acute  -     Alpha - 1 - Antitrypsin  -     Mitochondrial Antibodies, M2  -     Comprehensive Metabolic Panel; Future  -     Gamma GT; Future  -     Ambulatory Referral to Gastroenterology  -     Comprehensive Metabolic Panel  -     Cancel: Gamma GT  -     Bilirubin, Direct    2. Hypercholesterolemia  Assessment & Plan:  Lipids improved on atorvastatin 20 but not to goal, increase to 40 mg and will monitor liver enzymes closely.  I do not think his liver enzyme elevation is due to the statin.    Orders:  -     atorvastatin (LIPITOR) 40 MG tablet; Take 1 tablet by mouth Daily.  Dispense: 90 tablet; Refill: 3  -     Lipid Panel; Future  -     Lipid Panel    3. High risk medication use  -     CK; Future  -     CK    4. Primary hypertension  Assessment & Plan:  Hypertension is worsening.  Medication changes per " orders.  Blood pressure will be reassessed at the next regular appointment.    Orders:  -     losartan (COZAAR) 25 MG tablet; Take 1 tablet by mouth Daily.  Dispense: 90 tablet; Refill: 3         Medications:     Current Outpatient Medications:     atorvastatin (LIPITOR) 40 MG tablet, Take 1 tablet by mouth Daily., Disp: 90 tablet, Rfl: 3    levocetirizine (XYZAL) 5 MG tablet, Take 1 tablet by mouth Every Evening., Disp: 90 tablet, Rfl: 3    losartan (COZAAR) 25 MG tablet, Take 1 tablet by mouth Daily., Disp: 90 tablet, Rfl: 3        Follow Up:   No follow-ups on file.    Karin Mccartney PA-C   [facility]

## 2023-10-12 LAB
ALBUMIN SERPL ELPH-MCNC: 4.3 G/DL (ref 2.9–4.4)
ALBUMIN SERPL-MCNC: 4.7 G/DL (ref 3.8–4.9)
ALBUMIN/GLOB SERPL: 1.5 {RATIO} (ref 0.7–1.7)
ALBUMIN/GLOB SERPL: 2 {RATIO} (ref 1.2–2.2)
ALP SERPL-CCNC: 74 IU/L (ref 44–121)
ALPHA1 GLOB SERPL ELPH-MCNC: 0.2 G/DL (ref 0–0.4)
ALPHA2 GLOB SERPL ELPH-MCNC: 0.7 G/DL (ref 0.4–1)
ALT SERPL-CCNC: 97 IU/L (ref 0–44)
AST SERPL-CCNC: 53 IU/L (ref 0–40)
B-GLOBULIN SERPL ELPH-MCNC: 1 G/DL (ref 0.7–1.3)
BILIRUB DIRECT SERPL-MCNC: 0.15 MG/DL (ref 0–0.4)
BILIRUB SERPL-MCNC: 0.4 MG/DL (ref 0–1.2)
BUN SERPL-MCNC: 12 MG/DL (ref 6–24)
BUN/CREAT SERPL: 12 (ref 9–20)
CALCIUM SERPL-MCNC: 9.3 MG/DL (ref 8.7–10.2)
CERULOPLASMIN SERPL-MCNC: 19.3 MG/DL (ref 16–31)
CHLORIDE SERPL-SCNC: 101 MMOL/L (ref 96–106)
CHOLEST SERPL-MCNC: 222 MG/DL (ref 100–199)
CK SERPL-CCNC: 72 U/L (ref 41–331)
CO2 SERPL-SCNC: 24 MMOL/L (ref 20–29)
CREAT SERPL-MCNC: 0.97 MG/DL (ref 0.76–1.27)
EGFRCR SERPLBLD CKD-EPI 2021: 93 ML/MIN/1.73
ERYTHROCYTE [SEDIMENTATION RATE] IN BLOOD BY WESTERGREN METHOD: 13 MM/HR (ref 0–30)
FERRITIN SERPL-MCNC: 1040 NG/ML (ref 30–400)
GAMMA GLOB SERPL ELPH-MCNC: 1 G/DL (ref 0.4–1.8)
GGT SERPL-CCNC: 130 IU/L (ref 0–65)
GLOBULIN SER CALC-MCNC: 2.3 G/DL (ref 1.5–4.5)
GLOBULIN SER-MCNC: 2.9 G/DL (ref 2.2–3.9)
GLUCOSE SERPL-MCNC: 94 MG/DL (ref 70–99)
HAV IGM SERPL QL IA: NEGATIVE
HBV CORE IGM SERPL QL IA: NEGATIVE
HBV SURFACE AG SERPL QL IA: NEGATIVE
HCV AB SERPL QL IA: NORMAL
HCV IGG SERPL QL IA: NON REACTIVE
HDLC SERPL-MCNC: 56 MG/DL
IGA SERPL-MCNC: 284 MG/DL (ref 90–386)
IGG SERPL-MCNC: 1030 MG/DL (ref 603–1613)
IGM SERPL-MCNC: 40 MG/DL (ref 20–172)
INTERPRETATION SERPL IEP-IMP: NORMAL
IRON SATN MFR SERPL: 33 % (ref 15–55)
IRON SERPL-MCNC: 105 UG/DL (ref 38–169)
LABORATORY COMMENT REPORT: NORMAL
LDLC SERPL CALC-MCNC: 123 MG/DL (ref 0–99)
M PROTEIN SERPL ELPH-MCNC: NORMAL G/DL
MITOCHONDRIA M2 IGG SER-ACNC: <20 UNITS (ref 0–20)
POTASSIUM SERPL-SCNC: 4.6 MMOL/L (ref 3.5–5.2)
PROT SERPL-MCNC: 7 G/DL (ref 6–8.5)
PROT SERPL-MCNC: 7.2 G/DL (ref 6–8.5)
SMA IGG SER-ACNC: 5 UNITS (ref 0–19)
SODIUM SERPL-SCNC: 139 MMOL/L (ref 134–144)
TIBC SERPL-MCNC: 321 UG/DL (ref 250–450)
TRIGL SERPL-MCNC: 246 MG/DL (ref 0–149)
UIBC SERPL-MCNC: 216 UG/DL (ref 111–343)
VLDLC SERPL CALC-MCNC: 43 MG/DL (ref 5–40)

## 2023-10-13 LAB
A1AT SERPL-MCNC: 131 MG/DL (ref 101–187)
ANA SER QL IF: NEGATIVE

## 2023-10-14 NOTE — PROGRESS NOTES
Liver enzymes continue to be elevated but all the autoimmune labs that were collected were normal.  The plan is to still see gastroenterology for further evaluation.

## 2023-10-17 ENCOUNTER — TELEPHONE (OUTPATIENT)
Dept: FAMILY MEDICINE CLINIC | Facility: CLINIC | Age: 53
End: 2023-10-17
Payer: COMMERCIAL

## 2023-10-17 NOTE — TELEPHONE ENCOUNTER
Caller: MAXIMINOCHUCK    Relationship: Emergency Contact    Best call back number: [phone]     What test was performed: LAB WORK     When was the test performed: 10.11.23    Where was the test performed: [facility]     Additional notes: PLEASE HAVE LAB WORK READY FOR .     PLEASE CALL BACK WHEN READY, IF NO ANSWER LEAVE A MESSAGE.

## 2023-11-15 ENCOUNTER — OFFICE VISIT (OUTPATIENT)
Dept: FAMILY MEDICINE CLINIC | Facility: CLINIC | Age: 53
End: 2023-11-15
Payer: COMMERCIAL

## 2023-11-15 VITALS
BODY MASS INDEX: 27.47 KG/M2 | DIASTOLIC BLOOD PRESSURE: 80 MMHG | HEIGHT: 71 IN | WEIGHT: 196.2 LBS | RESPIRATION RATE: 15 BRPM | OXYGEN SATURATION: 99 % | HEART RATE: 86 BPM | SYSTOLIC BLOOD PRESSURE: 122 MMHG

## 2023-11-15 DIAGNOSIS — R74.8 ELEVATED LIVER ENZYMES: ICD-10-CM

## 2023-11-15 DIAGNOSIS — E78.00 HYPERCHOLESTEROLEMIA: Primary | ICD-10-CM

## 2023-11-15 DIAGNOSIS — R73.03 PREDIABETES: ICD-10-CM

## 2023-11-15 DIAGNOSIS — I10 PRIMARY HYPERTENSION: ICD-10-CM

## 2023-11-15 DIAGNOSIS — Z00.00 GENERAL MEDICAL EXAM: ICD-10-CM

## 2023-11-15 NOTE — ASSESSMENT & PLAN NOTE
Elevated liver enzymes completely resolved with 15 pound weight loss and lifestyle changes.  I will leave it up to the patient if he wants to go ahead and consult with gastroenterology or if he wants to cancel.  Regardless we will continue to monitor liver enzymes periodically.

## 2023-11-15 NOTE — PROGRESS NOTES
Patient Office Visit      Patient Name: Alex Reese  : 1970   MRN: [id number]     Chief Complaint:    Chief Complaint   Patient presents with    Hyperlipidemia    Prediabetes    Elevated Hepatic Enzymes       History of Present Illness: Alex Reese is a 53 y.o. male who is here today for follow-up.  He has gone on a 30-day strict diet.  He brought a copy of labs collected 2023 his liver enzymes returned to normal.  Total cholesterol 148, triglycerides 95, HDL 53, LDL 77.  Hemoglobin A1c was about the same at 6.2.  His GGT returned to normal.  Patient has lost about 15 pounds in the last 30 days.  He has an appointment scheduled gastroenterology 2023 for the evaluation of abnormal liver enzymes.  He feels good.    Subjective      Review of Systems:         Past Medical History:   Past Medical History:   Diagnosis Date    COVID-19     Mixed hyperlipidemia     Prediabetes        Past Surgical History: History reviewed. No pertinent surgical history.    Family History:   Family History   Problem Relation Age of Onset    Colon cancer Father        Social History:   Social History     Socioeconomic History    Marital status:    Tobacco Use    Smoking status: Former     Packs/day: 1.00     Years: 10.00     Additional pack years: 0.00     Total pack years: 10.00     Types: Cigarettes     Start date:      Quit date: 2001     Years since quittin.8    Smokeless tobacco: Never   Vaping Use    Vaping Use: Never used   Substance and Sexual Activity    Alcohol use: Yes     Comment: soically    Drug use: Never    Sexual activity: Yes     Partners: Female       Allergies:   Allergies   Allergen Reactions    Penicillins Unknown - High Severity     Pt has reaction when he was a child        Objective     Physical Exam:  Vital Signs:   Vitals:    11/15/23 1350   BP: 122/80   BP Location: Right arm   Patient Position: Sitting   Cuff Size: Adult   Pulse: 86   Resp: 15   SpO2:  "99%   Weight: 89 kg (196 lb 3.2 oz)   Height: 180.3 cm (71\")     Body mass index is 27.36 kg/m².           Physical Exam  Constitutional:       General: He is not in acute distress.     Appearance: Normal appearance.   Neurological:      Mental Status: He is alert.   Psychiatric:         Mood and Affect: Mood normal.         Behavior: Behavior normal.         Thought Content: Thought content normal.         Judgment: Judgment normal.         Procedures    Assessment / Plan      Assessment/Plan:   Diagnoses and all orders for this visit:    1. Hypercholesterolemia (Primary)  Assessment & Plan:  Lipid abnormalities are improving with treatment.  Pharmacotherapy as ordered.  Lipids will be reassessed in 6 months.  Continue atorvastatin 40 mg.  Lipids improved with medication and further improvement seen with lifestyle changes.      2. Elevated liver enzymes  Assessment & Plan:  Elevated liver enzymes completely resolved with 15 pound weight loss and lifestyle changes.  I will leave it up to the patient if he wants to go ahead and consult with gastroenterology or if he wants to cancel.  Regardless we will continue to monitor liver enzymes periodically.      3. Prediabetes  Assessment & Plan:  Hemoglobin A1c stable at 6.2, continue to monitor.      4. Primary hypertension  Assessment & Plan:  Hypertension is improving with treatment.  Continue current treatment regimen.  Blood pressure will be reassessed at the next regular appointment.      5. General medical exam  -     Comprehensive Metabolic Panel; Future  -     Vitamin B12; Future  -     Folate; Future  -     Lipid Panel; Future  -     Hemoglobin A1c; Future  -     CK; Future  -     TSH; Future  -     T4, Free; Future  -     CBC Auto Differential; Future           Medications:     Current Outpatient Medications:     atorvastatin (LIPITOR) 40 MG tablet, Take 1 tablet by mouth Daily., Disp: 90 tablet, Rfl: 3    levocetirizine (XYZAL) 5 MG tablet, Take 1 tablet by " mouth Every Evening., Disp: 90 tablet, Rfl: 3    losartan (COZAAR) 25 MG tablet, Take 1 tablet by mouth Daily., Disp: 90 tablet, Rfl: 3        Follow Up:   Return in about 6 months (around 5/15/2024) for Annual physical with labs one week prior.    Karin Mccartney PA-C   [facility]

## 2023-11-15 NOTE — ASSESSMENT & PLAN NOTE
Lipid abnormalities are improving with treatment.  Pharmacotherapy as ordered.  Lipids will be reassessed in 6 months.  Continue atorvastatin 40 mg.  Lipids improved with medication and further improvement seen with lifestyle changes.

## 2024-02-20 ENCOUNTER — TELEPHONE (OUTPATIENT)
Dept: FAMILY MEDICINE CLINIC | Facility: CLINIC | Age: 54
End: 2024-02-20
Payer: COMMERCIAL

## 2024-02-20 NOTE — TELEPHONE ENCOUNTER
Caller: [facility]    Relationship: Other    Best call back number: [phone]    What form or medical record are you requesting: LAST OFFICE NOTES    Who is requesting this form or medical record from you: CHERYL ARREDONDO    How would you like to receive the form or medical records (pick-up, mail, fax): FAX    If fax, what is the fax number: [phone]    Timeframe paperwork needed: ASAP

## 2024-11-08 ENCOUNTER — OFFICE VISIT (OUTPATIENT)
Dept: FAMILY MEDICINE CLINIC | Facility: CLINIC | Age: 54
End: 2024-11-08
Payer: COMMERCIAL

## 2024-11-08 VITALS
OXYGEN SATURATION: 98 % | WEIGHT: 213.2 LBS | DIASTOLIC BLOOD PRESSURE: 80 MMHG | HEIGHT: 71 IN | HEART RATE: 70 BPM | SYSTOLIC BLOOD PRESSURE: 142 MMHG | BODY MASS INDEX: 29.85 KG/M2

## 2024-11-08 DIAGNOSIS — M72.2 PLANTAR FASCIITIS, LEFT: Primary | ICD-10-CM

## 2024-11-08 PROCEDURE — 99213 OFFICE O/P EST LOW 20 MIN: CPT | Performed by: PHYSICIAN ASSISTANT

## 2024-11-08 NOTE — ASSESSMENT & PLAN NOTE
Written information given, recommend Voltaren gel over-the-counter and some home exercises.  Will refer to orthopedics.  Injection may or may not be appropriate.

## 2024-11-08 NOTE — PROGRESS NOTES
"      Patient Office Visit      Patient Name: Alex Reese  : 1970   MRN: [id number]     Chief Complaint:    Chief Complaint   Patient presents with    Foot Pain       History of Present Illness: Alex Reese is a 54 y.o. male who is here today complaining of pain in the heel of his left foot x 1 month.  Hurts in the morning and gradually better as he walks but really bad again by the end of the day.    Subjective      Review of Systems:         Past Medical History:   Past Medical History:   Diagnosis Date    COVID-19     Mixed hyperlipidemia     Prediabetes        Past Surgical History:   Past Surgical History:   Procedure Laterality Date    TOTAL SHOULDER REPLACEMENT Right 10/01/2024       Family History:   Family History   Problem Relation Age of Onset    Colon cancer Father        Social History:   Social History     Socioeconomic History    Marital status:    Tobacco Use    Smoking status: Former     Current packs/day: 0.00     Average packs/day: 1 pack/day for 10.0 years (10.0 ttl pk-yrs)     Types: Cigarettes     Start date:      Quit date: 2001     Years since quittin.8     Passive exposure: Past    Smokeless tobacco: Never   Vaping Use    Vaping status: Never Used   Substance and Sexual Activity    Alcohol use: Yes     Comment: soically    Drug use: Never    Sexual activity: Yes     Partners: Female       Allergies:   Allergies   Allergen Reactions    Penicillins Unknown - High Severity     Pt has reaction when he was a child        Objective     Physical Exam:  Vital Signs:   Vitals:    24 0818   BP: 142/80   BP Location: Left arm   Patient Position: Sitting   Cuff Size: Large Adult   Pulse: 70   SpO2: 98%   Weight: 96.7 kg (213 lb 3.2 oz)   Height: 180.3 cm (70.98\")   PainSc:   5   PainLoc: Foot  Comment: left heel     Body mass index is 29.75 kg/m².        Physical Exam  Constitutional:       General: He is not in acute distress.     Appearance: Normal " appearance.   Musculoskeletal:      Comments: Tender ball of left foot plantar fascial origin.   Neurological:      Mental Status: He is alert.   Psychiatric:         Mood and Affect: Mood normal.         Behavior: Behavior normal.         Thought Content: Thought content normal.         Judgment: Judgment normal.         Procedures    Assessment / Plan      Assessment/Plan:   Diagnoses and all orders for this visit:    1. Plantar fasciitis, left (Primary)           Medications:   No current outpatient medications on file.        Follow Up:   No follow-ups on file.    Karin Mccartney PA-C   [facility]     NOTE TO PATIENT: The 21st Century Cures Act makes medical notes like these available to patients in the interest of transparency. However, be advised this is a medical document. It is intended as peer to peer communication. It is written in medical language and may contain abbreviations or verbiage that are unfamiliar. It may appear blunt or direct. Medical documents are intended to carry relevant information, facts as evident, and the clinical opinion of the practitioner.

## 2024-12-10 ENCOUNTER — OFFICE VISIT (OUTPATIENT)
Dept: ORTHOPEDIC SURGERY | Facility: CLINIC | Age: 54
End: 2024-12-10
Payer: COMMERCIAL

## 2024-12-10 VITALS — BODY MASS INDEX: 30.04 KG/M2 | HEIGHT: 71 IN | WEIGHT: 214.6 LBS

## 2024-12-10 DIAGNOSIS — M76.62 LEFT ACHILLES TENDINITIS: ICD-10-CM

## 2024-12-10 DIAGNOSIS — S90.30XA CONTUSION OF FOOT OR HEEL, INITIAL ENCOUNTER: ICD-10-CM

## 2024-12-10 DIAGNOSIS — M79.672 LEFT FOOT PAIN: Primary | ICD-10-CM
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
